# Patient Record
Sex: MALE | Race: ASIAN | NOT HISPANIC OR LATINO | Employment: UNEMPLOYED | URBAN - METROPOLITAN AREA
[De-identification: names, ages, dates, MRNs, and addresses within clinical notes are randomized per-mention and may not be internally consistent; named-entity substitution may affect disease eponyms.]

---

## 2019-03-06 ENCOUNTER — AMBULATORY - HEALTHEAST (OUTPATIENT)
Dept: CARDIAC REHAB | Facility: CLINIC | Age: 49
End: 2019-03-06

## 2019-03-06 DIAGNOSIS — Z95.1 S/P CABG X 4: ICD-10-CM

## 2019-03-27 ENCOUNTER — AMBULATORY - HEALTHEAST (OUTPATIENT)
Dept: CARDIAC REHAB | Facility: CLINIC | Age: 49
End: 2019-03-27

## 2019-03-27 DIAGNOSIS — Z95.1 S/P CABG X 4: ICD-10-CM

## 2019-03-27 RX ORDER — CLOPIDOGREL BISULFATE 75 MG/1
75 TABLET ORAL DAILY
Status: SHIPPED | COMMUNITY
Start: 2019-03-27

## 2019-03-27 RX ORDER — ASPIRIN 81 MG/1
81 TABLET, CHEWABLE ORAL DAILY
Status: SHIPPED | COMMUNITY
Start: 2019-03-27

## 2019-03-27 RX ORDER — ATORVASTATIN CALCIUM 80 MG/1
80 TABLET, FILM COATED ORAL AT BEDTIME
Status: SHIPPED | COMMUNITY
Start: 2019-03-27

## 2019-03-27 RX ORDER — NITROGLYCERIN 0.4 MG/1
0.4 TABLET SUBLINGUAL EVERY 5 MIN PRN
Status: SHIPPED | COMMUNITY
Start: 2019-03-27

## 2019-03-29 ENCOUNTER — AMBULATORY - HEALTHEAST (OUTPATIENT)
Dept: CARDIAC REHAB | Facility: CLINIC | Age: 49
End: 2019-03-29

## 2019-03-29 DIAGNOSIS — Z95.1 S/P CABG X 4: ICD-10-CM

## 2019-04-02 ENCOUNTER — AMBULATORY - HEALTHEAST (OUTPATIENT)
Dept: CARDIAC REHAB | Facility: CLINIC | Age: 49
End: 2019-04-02

## 2019-04-02 DIAGNOSIS — Z95.1 S/P CABG X 4: ICD-10-CM

## 2019-04-03 ENCOUNTER — AMBULATORY - HEALTHEAST (OUTPATIENT)
Dept: CARDIAC REHAB | Facility: CLINIC | Age: 49
End: 2019-04-03

## 2019-04-03 DIAGNOSIS — Z95.1 S/P CABG X 4: ICD-10-CM

## 2019-04-05 ENCOUNTER — AMBULATORY - HEALTHEAST (OUTPATIENT)
Dept: CARDIAC REHAB | Facility: CLINIC | Age: 49
End: 2019-04-05

## 2019-04-05 DIAGNOSIS — Z95.1 S/P CABG X 4: ICD-10-CM

## 2019-04-12 ENCOUNTER — AMBULATORY - HEALTHEAST (OUTPATIENT)
Dept: CARDIAC REHAB | Facility: CLINIC | Age: 49
End: 2019-04-12

## 2019-04-12 DIAGNOSIS — Z95.1 S/P CABG X 4: ICD-10-CM

## 2019-04-16 ENCOUNTER — AMBULATORY - HEALTHEAST (OUTPATIENT)
Dept: CARDIAC REHAB | Facility: CLINIC | Age: 49
End: 2019-04-16

## 2019-04-16 DIAGNOSIS — Z95.1 S/P CABG X 4: ICD-10-CM

## 2019-04-24 ENCOUNTER — AMBULATORY - HEALTHEAST (OUTPATIENT)
Dept: CARDIAC REHAB | Facility: CLINIC | Age: 49
End: 2019-04-24

## 2019-04-24 DIAGNOSIS — Z95.1 S/P CABG X 4: ICD-10-CM

## 2019-04-26 ENCOUNTER — AMBULATORY - HEALTHEAST (OUTPATIENT)
Dept: CARDIAC REHAB | Facility: CLINIC | Age: 49
End: 2019-04-26

## 2019-04-26 DIAGNOSIS — Z95.1 S/P CABG X 4: ICD-10-CM

## 2019-05-01 ENCOUNTER — AMBULATORY - HEALTHEAST (OUTPATIENT)
Dept: CARDIAC REHAB | Facility: CLINIC | Age: 49
End: 2019-05-01

## 2019-05-01 DIAGNOSIS — Z95.1 S/P CABG X 4: ICD-10-CM

## 2019-05-06 ENCOUNTER — AMBULATORY - HEALTHEAST (OUTPATIENT)
Dept: CARDIAC REHAB | Facility: CLINIC | Age: 49
End: 2019-05-06

## 2019-05-06 DIAGNOSIS — Z95.1 S/P CABG X 4: ICD-10-CM

## 2019-05-08 ENCOUNTER — AMBULATORY - HEALTHEAST (OUTPATIENT)
Dept: CARDIAC REHAB | Facility: CLINIC | Age: 49
End: 2019-05-08

## 2019-05-08 DIAGNOSIS — Z95.1 S/P CABG X 4: ICD-10-CM

## 2019-05-10 ENCOUNTER — AMBULATORY - HEALTHEAST (OUTPATIENT)
Dept: CARDIAC REHAB | Facility: CLINIC | Age: 49
End: 2019-05-10

## 2019-05-10 DIAGNOSIS — Z95.1 S/P CABG X 4: ICD-10-CM

## 2019-05-14 ENCOUNTER — AMBULATORY - HEALTHEAST (OUTPATIENT)
Dept: CARDIAC REHAB | Facility: CLINIC | Age: 49
End: 2019-05-14

## 2019-05-14 DIAGNOSIS — Z95.1 S/P CABG X 4: ICD-10-CM

## 2019-05-15 ENCOUNTER — AMBULATORY - HEALTHEAST (OUTPATIENT)
Dept: CARDIAC REHAB | Facility: CLINIC | Age: 49
End: 2019-05-15

## 2019-05-15 DIAGNOSIS — Z95.1 S/P CABG X 4: ICD-10-CM

## 2019-05-16 ENCOUNTER — AMBULATORY - HEALTHEAST (OUTPATIENT)
Dept: CARDIAC REHAB | Facility: CLINIC | Age: 49
End: 2019-05-16

## 2019-05-16 DIAGNOSIS — Z95.1 S/P CABG X 4: ICD-10-CM

## 2019-05-24 ENCOUNTER — AMBULATORY - HEALTHEAST (OUTPATIENT)
Dept: CARDIAC REHAB | Facility: CLINIC | Age: 49
End: 2019-05-24

## 2019-05-24 DIAGNOSIS — Z95.1 S/P CABG X 4: ICD-10-CM

## 2019-05-31 ENCOUNTER — AMBULATORY - HEALTHEAST (OUTPATIENT)
Dept: CARDIAC REHAB | Facility: CLINIC | Age: 49
End: 2019-05-31

## 2019-05-31 DIAGNOSIS — Z95.1 S/P CABG X 4: ICD-10-CM

## 2019-06-05 ENCOUNTER — AMBULATORY - HEALTHEAST (OUTPATIENT)
Dept: CARDIAC REHAB | Facility: CLINIC | Age: 49
End: 2019-06-05

## 2019-06-05 DIAGNOSIS — Z95.1 S/P CABG X 4: ICD-10-CM

## 2019-06-12 ENCOUNTER — AMBULATORY - HEALTHEAST (OUTPATIENT)
Dept: CARDIAC REHAB | Facility: CLINIC | Age: 49
End: 2019-06-12

## 2019-06-12 DIAGNOSIS — Z95.1 S/P CABG X 4: ICD-10-CM

## 2019-06-14 ENCOUNTER — AMBULATORY - HEALTHEAST (OUTPATIENT)
Dept: CARDIAC REHAB | Facility: CLINIC | Age: 49
End: 2019-06-14

## 2019-06-14 DIAGNOSIS — Z95.1 S/P CABG X 4: ICD-10-CM

## 2019-06-19 ENCOUNTER — AMBULATORY - HEALTHEAST (OUTPATIENT)
Dept: CARDIAC REHAB | Facility: CLINIC | Age: 49
End: 2019-06-19

## 2019-06-19 DIAGNOSIS — Z95.1 S/P CABG X 4: ICD-10-CM

## 2019-06-28 ENCOUNTER — AMBULATORY - HEALTHEAST (OUTPATIENT)
Dept: CARDIAC REHAB | Facility: CLINIC | Age: 49
End: 2019-06-28

## 2019-06-28 DIAGNOSIS — Z95.1 S/P CABG X 4: ICD-10-CM

## 2019-07-03 ENCOUNTER — AMBULATORY - HEALTHEAST (OUTPATIENT)
Dept: CARDIAC REHAB | Facility: CLINIC | Age: 49
End: 2019-07-03

## 2019-07-03 DIAGNOSIS — Z95.1 S/P CABG X 4: ICD-10-CM

## 2019-07-08 ENCOUNTER — AMBULATORY - HEALTHEAST (OUTPATIENT)
Dept: CARDIAC REHAB | Facility: CLINIC | Age: 49
End: 2019-07-08

## 2019-07-08 DIAGNOSIS — Z95.1 S/P CABG X 4: ICD-10-CM

## 2019-07-09 ENCOUNTER — AMBULATORY - HEALTHEAST (OUTPATIENT)
Dept: CARDIAC REHAB | Facility: CLINIC | Age: 49
End: 2019-07-09

## 2019-07-09 DIAGNOSIS — Z95.1 S/P CABG X 4: ICD-10-CM

## 2020-06-16 ENCOUNTER — VIRTUAL VISIT (OUTPATIENT)
Dept: FAMILY MEDICINE | Facility: OTHER | Age: 50
End: 2020-06-16

## 2020-06-16 ENCOUNTER — AMBULATORY - HEALTHEAST (OUTPATIENT)
Dept: FAMILY MEDICINE | Facility: CLINIC | Age: 50
End: 2020-06-16

## 2020-06-16 DIAGNOSIS — Z20.822 SUSPECTED COVID-19 VIRUS INFECTION: ICD-10-CM

## 2020-06-16 NOTE — PROGRESS NOTES
"Date: 2020 11:02:02  Clinician: Barney Saul  Clinician NPI: [id number]  Patient: Gayla Banda  Patient : 1970  Patient Address: [address] [place], [place] [id number]  Patient Phone: [phone]  Visit Protocol: URI  Patient Summary:  Gayla is a 50 year old ( : 1970 ) male who initiated a Visit for COVID-19 (Coronavirus) evaluation and screening. When asked the question \"Please sign me up to receive news, health information and promotions from [employer].\", Gayla responded \"No\".    Gayla states his symptoms started 1-2 days ago.   His symptoms consist of diarrhea and a cough.   Symptom details   Cough: Gayla coughs a few times an hour and his cough is not more bothersome at night. Phlegm comes into his throat when he coughs. He does not believe his cough is caused by post-nasal drip. The color of the phlegm is yellow.    Gayla denies having wheezing, nausea, teeth pain, ageusia, sore throat, enlarged lymph nodes, malaise, myalgias, anosmia, facial pain or pressure, fever, nasal congestion, vomiting, rhinitis, ear pain, headache, and chills. He also denies having recent facial or sinus surgery in the past 60 days and taking antibiotic medication for the symptoms. He is not experiencing dyspnea.   Precipitating events  He has not recently been exposed to someone with influenza. Gayla has been in close contact with the following high risk individuals: children under the age of 5.   Pertinent COVID-19 (Coronavirus) information  In the past 14 days, Gayla has not worked in a congregate living setting.   He does not work or volunteer as healthcare worker or a  and does not work or volunteer in a healthcare facility.   Gayla also has not lived in a congregate living setting in the past 14 days. He does not live with a healthcare worker.   Gayla has not had a close contact with a laboratory-confirmed COVID-19 patient within 14 days of symptom onset.   Pertinent medical history  Gayla does not need a return to " work/school note.   Weight: 190 lbs   Gayla does not smoke or use smokeless tobacco.   Weight: 190 lbs  Reason for repeat visit for the same protocol within 24 hours:  your site is screwy. I had to leave the page to look up my meds and it killed the page. This process is SOOOO difficult. As a college grad I'm having a hard time I can't imagine how immigrant and low English proficiency would have to deal with this CRAP!!!!!!!!!!!!  See the History of referred by protocol and completed visits section for additional details on the previous visit.    MEDICATIONS: metoprolol tartrate-hydrochlorothiazide oral, ALLERGIES: NKDA  Clinician Response:  Dear Gayla,   Your symptoms show that you may have coronavirus (COVID-19). This illness can cause fever, cough and trouble breathing. Many people get a mild case and get better on their own. Some people can get very sick.  What should I do?  We would like to test you for this virus.   1. Please call [phone] to schedule your visit. Explain that you were referred by [facility] to have a COVID-19 test. Be ready to share your [facility] visit ID number.  The following will serve as your written order for this COVID Test, ordered by me, for the indication of suspected COVID [Z20.828]: The test will be ordered in [facility], our electronic health record, after you are scheduled. It will show as ordered and authorized by Michael Oliveira MD.  Order: COVID-19 (Coronavirus) PCR for SYMPTOMATIC testing from [facility].      2. When it's time for your COVID test:  Stay at least 6 feet away from others. (If someone will drive you to your test, stay in the backseat, as far away from the  as you can.)   Cover your mouth and nose with a mask, tissue or washcloth.  Go straight to the testing site. Don't make any stops on the way there or back.      3.Starting now: Stay home and away from others (self-isolate) until:   You've had no fever---and no medicine that reduces fever---for 3 full days (72 hours). And...  "  Your other symptoms have gotten better. For example, your cough or breathing has improved. And...   At least 10 days have passed since your symptoms started.       During this time, don't leave the house except for testing or medical care.   Stay in your own room, even for meals. Use your own bathroom if you can.   Stay away from others in your home. No hugging, kissing or shaking hands. No visitors.  Don't go to work, school or anywhere else.    Clean \"high touch\" surfaces often (doorknobs, counters, handles, etc.). Use a household cleaning spray or wipes. You'll find a full list of  on the EPA website: www.epa.gov/pesticide-registration/list-n-disinfectants-use-against-sars-cov-2.   Cover your mouth and nose with a mask, tissue or washcloth to avoid spreading germs.  Wash your hands and face often. Use soap and water.  Caregivers in these groups are at risk for severe illness due to COVID-19:  o People 65 years and older  o People who live in a nursing home or long-term care facility  o People with chronic disease (lung, heart, cancer, diabetes, kidney, liver, immunologic)  o People who have a weakened immune system, including those who:   Are in cancer treatment  Take medicine that weakens the immune system, such as corticosteroids  Had a bone marrow or organ transplant  Have an immune deficiency  Have poorly controlled HIV or AIDS  Are obese (body mass index of 40 or higher)  Smoke regularly   o Caregivers should wear gloves while washing dishes, handling laundry and cleaning bedrooms and bathrooms.  o Use caution when washing and drying laundry: Don't shake dirty laundry, and use the warmest water setting that you can.  o For more tips, go to www.cdc.gov/coronavirus/2019-ncov/downloads/10Things.pdf.      How can I take care of myself?   Get lots of rest. Drink extra fluids (unless a doctor has told you not to).   Take Tylenol (acetaminophen) for fever or pain. If you have liver or kidney problems, ask " your family doctor if it's okay to take Tylenol.   Adults can take either:    650 mg (two 325 mg pills) every 4 to 6 hours, or...   1,000 mg (two 500 mg pills) every 8 hours as needed.    Note: Don't take more than 3,000 mg in one day. Acetaminophen is found in many medicines (both prescribed and over-the-counter medicines). Read all labels to be sure you don't take too much.   For children, check the Tylenol bottle for the right dose. The dose is based on the child's age or weight.    If you have other health problems (like cancer, heart failure, an organ transplant or severe kidney disease): Call your specialty clinic if you don't feel better in the next 2 days.       Know when to call 911. Emergency warning signs include:    Trouble breathing or shortness of breath Pain or pressure in the chest that doesn't go away Feeling confused like you haven't felt before, or not being able to wake up Bluish-colored lips or face.  Where can I get more information?   [facility] -- About COVID-19: [email or web address]   CDC -- What to Do If You're Sick: www.cdc.gov/coronavirus/2019-ncov/about/steps-when-sick.html   CDC -- Ending Home Isolation: www.cdc.gov/coronavirus/2019-ncov/hcp/disposition-in-home-patients.html   [facility] -- Caring for Someone: www.cdc.gov/coronavirus/2019-ncov/if-you-are-sick/care-for-someone.html   [facility] -- Interim Guidance for Hospital Discharge to Home: www.health.[facility].mn.us/diseases/coronavirus/hcp/hospdischarge.pdf   [facility] clinical trials (COVID-19 research studies): [email or web address]-clinical-trials    Below are the COVID-19 hotlines at the [place] Department of Health ([facility]). Interpreters are available.    For health questions: Call 450-797-9621 or 1-872.255.8704 (7 a.m. to 7 p.m.) For questions about schools and childcare: Call 850-969-1425 or 1-108.127.3775 (7 a.m. to 7 p.m.)    Diagnosis: Diarrhea, unspecified  Diagnosis ICD: R19.7

## 2020-06-18 ENCOUNTER — COMMUNICATION - HEALTHEAST (OUTPATIENT)
Dept: FAMILY MEDICINE | Facility: CLINIC | Age: 50
End: 2020-06-18

## 2020-09-04 ENCOUNTER — VIRTUAL VISIT (OUTPATIENT)
Dept: FAMILY MEDICINE | Facility: OTHER | Age: 50
End: 2020-09-04
Payer: COMMERCIAL

## 2020-09-04 PROCEDURE — 99421 OL DIG E/M SVC 5-10 MIN: CPT | Performed by: NURSE PRACTITIONER

## 2020-09-04 NOTE — PROGRESS NOTES
"Date: 2020 01:50:52  Clinician: Noris Lopez  Clinician NPI: [id number]  Patient: Gayla Banda  Patient : 1970  Patient Address: [address] [place], [place] [address]  Patient Phone: [phone]  Visit Protocol: URI  Patient Summary:  Gayla is a 50 year old ( : 1970 ) male who initiated a Visit for COVID-19 (Coronavirus) evaluation and screening. When asked the question \"Please sign me up to receive news, health information and promotions from [employer].\", Gayla responded \"No\".    Gayla states his symptoms started suddenly 5-6 days ago. After his symptoms started, they improved and then got worse again.   His symptoms consist of diarrhea and malaise.   Gayla denies having ear pain, anosmia, facial pain or pressure, fever, vomiting, rhinitis, nausea, wheezing, sore throat, teeth pain, ageusia, cough, nasal congestion, headache, chills, enlarged lymph nodes, and myalgias. He also denies taking antibiotic medication in the past month and having recent facial or sinus surgery in the past 60 days. He is not experiencing dyspnea.    Pertinent COVID-19 (Coronavirus) information  In the past 14 days, Gayla has not worked in a congregate living setting.   He does not work or volunteer as healthcare worker or a  and does not work or volunteer in a healthcare facility.   Gayla also has not lived in a congregate living setting in the past 14 days. He does not live with a healthcare worker.   Gayla has had a close contact with a laboratory-confirmed COVID-19 patient within 14 days of symptom onset.   Since 2019, Gayla and has not had upper respiratory infection or influenza-like illness. has been diagnosed with lab-confirmed COVID-19 test    Date of his positive COVID-19 test: 2020    Pertinent medical history  Gayla does not need a return to work/school note.   Weight: 190 lbs   Gayla does not smoke or use smokeless tobacco.   Weight: 190 lbs    MEDICATIONS: No current medications, ALLERGIES: " NKDA  Clinician Response:  Dear Shukla,  Your health is our priority. Based on the information you have provided, it is possible that you may have some type of viral infection.  Please read the full treatment plan and see my recommendations below.  Medication information  Because you have a viral infection, antibiotics will not help you get better. Treating a viral infection with antibiotics could actually make you feel worse.  Self care  Steps you can take to be as comfortable as possible:     Rest.    Drink plenty of fluids.     COVID-19 (Coronavirus) General Information  Because there is currently no vaccine to prevent infection, the best way to protect yourself is to avoid being exposed to this virus. Common symptoms of COVID-19 include but are not limited to fever, cough, and shortness of breath. These symptoms appear 2-14 days after you are exposed to the virus that causes COVID-19. Click here for more information from the CDC on how to protect yourself.  If you are sick with COVID-19 or suspect you are infected with the virus that causes COVID-19, follow the steps here from the CDC to help prevent the disease from spreading to people in your home and community.  Click here for general information from the CDC on testing.  If you develop any of these emergency warning signs for COVID-19, get medical attention immediately:     Trouble breathing    Persistent pain or pressure in the chest    New confusion or inability to arouse    Bluish lips or face      Call your doctor or clinic before going in. Call 911 if you have a medical emergency and notify the  you have or think you may have COVID-19.  For more detailed and up to date information on COVID-19 (Coronavirus), please visit the CDC website.   Diagnosis: COVID-19 (Coronavirus) concern  Diagnosis ICD: Z20.828  Additional Clinician Notes:  You can go to the CDC website for more information.&nbsp;   Drink plenty of fluids to stay hydrated. You can try over  the counter medications for your symptoms such as pepto bismuth or Imodium. Follow up with your primary provider within a week.&nbsp;

## 2020-09-08 ENCOUNTER — VIRTUAL VISIT (OUTPATIENT)
Dept: FAMILY MEDICINE | Facility: OTHER | Age: 50
End: 2020-09-08
Payer: COMMERCIAL

## 2020-09-08 PROCEDURE — 99421 OL DIG E/M SVC 5-10 MIN: CPT | Performed by: FAMILY MEDICINE

## 2020-09-08 NOTE — PROGRESS NOTES
"Date: 2020 12:46:36  Clinician: Jose Ramon Rick  Clinician NPI: [id number]  Patient: Gayla Banda  Patient : 1970  Patient Address: [address] [place], [place] [id number]  Patient Phone: [phone]  Visit Protocol: URI  Patient Summary:  Gayla is a 50 year old ( : 1970 ) male who initiated a Visit for COVID-19 (Coronavirus) evaluation and screening. When asked the question \"Please sign me up to receive news, health information and promotions from [employer].\", Gayla responded \"No\".    Gayla states his symptoms started today.   His symptoms consist of facial pain or pressure and myalgia.   Symptom details   Facial pain or pressure: The facial pain or pressure feels worse when bending over or leaning forward.    Gayla denies having ear pain, anosmia, fever, vomiting, rhinitis, nausea, wheezing, sore throat, teeth pain, ageusia, diarrhea, cough, nasal congestion, headache, chills, and malaise. He also denies taking antibiotic medication in the past month and having recent facial or sinus surgery in the past 60 days. He is not experiencing dyspnea.   Precipitating events  He has recently been exposed to someone with influenza. Gayla has not been in close contact with any high risk individuals.   Pertinent COVID-19 (Coronavirus) information  In the past 14 days, Gayla has not worked in a congregate living setting.   He does not work or volunteer as healthcare worker or a  and does not work or volunteer in a healthcare facility.   Gayla also has not lived in a congregate living setting in the past 14 days. He does not live with a healthcare worker.   Gayla has had a close contact with a laboratory-confirmed COVID-19 patient within 14 days of symptom onset.   Since 2019, Gayla and has not had upper respiratory infection or influenza-like illness. Has not been diagnosed with lab-confirmed COVID-19 test   Pertinent medical history  Gayla does not need a return to work/school note.   Weight: 210 lbs   Shukla " "does not smoke or use smokeless tobacco.   Weight: 210 lbs    MEDICATIONS: No current medications, ALLERGIES: NKDA  Clinician Response:  Dear Shukla,   Your symptoms show that you may have coronavirus (COVID-19). This illness can cause fever, cough and trouble breathing. Many people get a mild case and get better on their own. Some people can get very sick.  What should I do?  We would like to test you for this virus.   1. Please call [phone] to schedule your visit. Explain that you were referred by [facility] to have a COVID-19 test. Be ready to share [facility].  The following will serve as your written order for this COVID Test, ordered by me, for the indication of suspected COVID [Z20.828]: The test will be ordered in [facility], our electronic health record, after you are scheduled. It will show as ordered and authorized by Michael Oliveira MD.  Order: COVID-19 (Coronavirus) PCR for SYMPTOMATIC testing from [facility].      2. When it's time for your COVID test:  Stay at least 6 feet away from others. (If someone will drive you to your test, stay in the backseat, as far away from the  as you can.)   Cover your mouth and nose with a mask, tissue or washcloth.  Go straight to the testing site. Don't make any stops on the way there or back.      3.Starting now: Stay home and away from others (self-isolate) until:   You've had no fever---and no medicine that reduces fever---for one full day (24 hours). And...   Your other symptoms have gotten better. For example, your cough or breathing has improved. And...   At least 10 days have passed since your symptoms started.       During this time, don't leave the house except for testing or medical care.   Stay in your own room, even for meals. Use your own bathroom if you can.   Stay away from others in your home. No hugging, kissing or shaking hands. No visitors.  Don't go to work, school or anywhere else.    Clean \"high touch\" surfaces often (doorknobs, counters, " handles, etc.). Use a household cleaning spray or wipes. You'll find a full list of  on the EPA website: www.epa.gov/pesticide-registration/list-n-disinfectants-use-against-sars-cov-2.   Cover your mouth and nose with a mask, tissue or washcloth to avoid spreading germs.  Wash your hands and face often. Use soap and water.  Caregivers in these groups are at risk for severe illness due to COVID-19:  o People 65 years and older  o People who live in a nursing home or long-term care facility  o People with chronic disease (lung, heart, cancer, diabetes, kidney, liver, immunologic)  o People who have a weakened immune system, including those who:   Are in cancer treatment  Take medicine that weakens the immune system, such as corticosteroids  Had a bone marrow or organ transplant  Have an immune deficiency  Have poorly controlled HIV or AIDS  Are obese (body mass index of 40 or higher)  Smoke regularly   o Caregivers should wear gloves while washing dishes, handling laundry and cleaning bedrooms and bathrooms.  o Use caution when washing and drying laundry: Don't shake dirty laundry, and use the warmest water setting that you can.  o For more tips, go to www.cdc.gov/coronavirus/2019-ncov/downloads/10Things.pdf.    4.Sign up for [employer] [facility]. We know it's scary to hear that you might have COVID-19. We want to track your symptoms to make sure you're okay over the next 2 weeks. Please look for an email from [employer]---this is a free, online program that we'll use to keep in touch. To sign up, follow the link in the email. Learn more at [email or web address]  How can I take care of myself?   Get lots of rest. Drink extra fluids (unless a doctor has told you not to).   Take Tylenol (acetaminophen) for fever or pain. If you have liver or kidney problems, ask your family doctor if it's okay to take Tylenol.   Adults can take either:    650 mg (two 325 mg pills) every 4 to 6 hours, or...   1,000 mg (two  500 mg pills) every 8 hours as needed.    Note: Don't take more than 3,000 mg in one day. Acetaminophen is found in many medicines (both prescribed and over-the-counter medicines). Read all labels to be sure you don't take too much.   For children, check the Tylenol bottle for the right dose. The dose is based on the child's age or weight.    If you have other health problems (like cancer, heart failure, an organ transplant or severe kidney disease): Call your specialty clinic if you don't feel better in the next 2 days.       Know when to call 911. Emergency warning signs include:    Trouble breathing or shortness of breath Pain or pressure in the chest that doesn't go away Feeling confused like you haven't felt before, or not being able to wake up Bluish-colored lips or face.  Where can I get more information?   [facility] -- About COVID-19: [email or web address]   CDC -- What to Do If You're Sick: www.cdc.gov/coronavirus/2019-ncov/about/steps-when-sick.html   CDC -- Ending Home Isolation: www.cdc.gov/coronavirus/2019-ncov/hcp/disposition-in-home-patients.html   CDC -- Caring for Someone: www.cdc.gov/coronavirus/2019-ncov/if-you-are-sick/care-for-someone.html   [facility] -- Interim Guidance for Hospital Discharge to Home: www.health.[facility].mn.us/diseases/coronavirus/hcp/hospdischarge.pdf   [facility] clinical trials (COVID-19 research studies): clinicalaffairs.[facility]-clinical-trials    Below are the COVID-19 hotlines at the [place] Department of Health ([facility]). Interpreters are available.    For health questions: Call 098-649-7884 or 1-143.154.5677 (7 a.m. to 7 p.m.) For questions about schools and childcare: Call 822-665-7710 or 1-888.119.9176 (7 a.m. to 7 p.m.)    Diagnosis: Myalgia, unspecified site  Diagnosis ICD: M79.10

## 2020-09-09 ENCOUNTER — AMBULATORY - HEALTHEAST (OUTPATIENT)
Dept: FAMILY MEDICINE | Facility: CLINIC | Age: 50
End: 2020-09-09

## 2020-09-09 ENCOUNTER — AMBULATORY - HEALTHEAST (OUTPATIENT)
Dept: INTERNAL MEDICINE | Facility: CLINIC | Age: 50
End: 2020-09-09

## 2020-09-09 DIAGNOSIS — Z20.822 SUSPECTED 2019 NOVEL CORONAVIRUS INFECTION: ICD-10-CM

## 2020-09-11 ENCOUNTER — COMMUNICATION - HEALTHEAST (OUTPATIENT)
Dept: SCHEDULING | Facility: CLINIC | Age: 50
End: 2020-09-11

## 2020-11-16 ENCOUNTER — AMBULATORY - HEALTHEAST (OUTPATIENT)
Dept: FAMILY MEDICINE | Facility: CLINIC | Age: 50
End: 2020-11-16

## 2020-11-16 ENCOUNTER — VIRTUAL VISIT (OUTPATIENT)
Dept: FAMILY MEDICINE | Facility: OTHER | Age: 50
End: 2020-11-16

## 2020-11-16 DIAGNOSIS — Z20.822 SUSPECTED COVID-19 VIRUS INFECTION: ICD-10-CM

## 2020-11-16 NOTE — PROGRESS NOTES
"Date: 2020 10:06:48  Clinician: Barney Saul  Clinician NPI: [id number]  Patient: Gayla Banda  Patient : 1970  Patient Address: [address] [place], [place] [id number]  Patient Phone: [phone]  Visit Protocol: URI  Patient Summary:  Gayla is a 50 year old ( : 1970 ) male who initiated a [facility] Visit for COVID-19 (Coronavirus) evaluation and screening. When asked the question \"Please sign me up to receive news, health information and promotions from [facility].\", Gayla responded \"No\".    Gayla states his symptoms started 1-2 days ago.   His symptoms consist of rhinitis, facial pain or pressure, myalgia, chills, a sore throat, diarrhea, a headache, a cough, and nasal congestion. Gayla also feels feverish but was unable to measure his temperature.   Symptom details     Nasal secretions: The color of his mucus is white.    Cough: Gayla coughs a few times an hour and his cough is not more bothersome at night. Phlegm does not come into his throat when he coughs. He does not believe his cough is caused by post-nasal drip.     Sore throat: Gayla reports having mild throat pain (1-3 on a 10 point pain scale), does not have exudate on his tonsils, and can swallow liquids. He is not sure if the lymph nodes in his neck are enlarged. A rash has not appeared on the skin since the sore throat started.     Facial pain or pressure: The facial pain or pressure does not feel worse when bending or leaning forward.     Headache: He states the headache is mild (1-3 on a 10 point pain scale).      Gayla denies having vomiting, malaise, teeth pain, ageusia, ear pain, wheezing, nausea, and anosmia. He also denies taking antibiotic medication in the past month, having recent facial or sinus surgery in the past 60 days, and having a sinus infection within the past year. He is not experiencing dyspnea.   Precipitating events  Within the past week, Gayla has not been exposed to someone with strep throat. He has not recently been exposed " to someone with influenza. Gayla has been in close contact with the following high risk individuals: immunocompromised people and people with asthma, heart disease or diabetes.   Pertinent COVID-19 (Coronavirus) information  Gayla does not work or volunteer as healthcare worker or a . In the past 14 days, aGyla has not worked or volunteered at a healthcare facility or group living setting.   In the past 14 days, he also has not lived in a congregate living setting.   Gayla has had a close contact with a laboratory-confirmed COVID-19 patient within 14 days of symptom onset. He was not exposed at his work. He does not know when he was exposed to the laboratory-confirmed COVID-19 patient.   Additional information about contact with COVID-19 (Coronavirus) patient as reported by the patient (free text): a relative    Since December 2019, Gayla has been tested for COVID-19 and has not had upper respiratory infection or influenza-like illness.      Result of COVID-19 test: Negative     Date of his COVID-19 test: 09/20/2020      Pertinent medical history  Gayla does not need a return to work/school note.   Weight: 180 lbs   Gayla does not smoke or use smokeless tobacco.   Weight: 180 lbs    MEDICATIONS: No current medications, ALLERGIES: NKDA  Clinician Response:  Dear Gayla,   Your symptoms show that you may have coronavirus (COVID-19). This illness can cause fever, cough and trouble breathing. Many people get a mild case and get better on their own. Some people can get very sick.  What should I do?  We would like to test you for this virus.   1. Please call [phone] to schedule your visit. Explain that you were referred by [facility] to have a COVID-19 test. Be ready to share your [facility] visit ID number.  * If you need to schedule in [facility] please call [phone] or for [employer] employees please call [phone].  * If you need to schedule in the [place] area please call [phone]. [place] employees call  "[phone].  The following will serve as your written order for this COVID Test, ordered by me, for the indication of suspected COVID [Z20.828]: The test will be ordered in [facility], our electronic health record, after you are scheduled. It will show as ordered and authorized by Michael Oliveira MD.  Order: COVID-19 (Coronavirus) PCR for SYMPTOMATIC testing from [facility].   2. When it's time for your COVID test:  Stay at least 6 feet away from others. (If someone will drive you to your test, stay in the backseat, as far away from the  as you can.)   Cover your mouth and nose with a mask, tissue or washcloth.  Go straight to the testing site. Don't make any stops on the way there or back.      3.Starting now: Stay home and away from others (self-isolate) until:   You've had no fever---and no medicine that reduces fever---for one full day (24 hours). And...   Your other symptoms have gotten better. For example, your cough or breathing has improved. And...   At least 10 days have passed since your symptoms started.       During this time, don't leave the house except for testing or medical care.   Stay in your own room, even for meals. Use your own bathroom if you can.   Stay away from others in your home. No hugging, kissing or shaking hands. No visitors.  Don't go to work, school or anywhere else.    Clean \"high touch\" surfaces often (doorknobs, counters, handles, etc.). Use a household cleaning spray or wipes. You'll find a full list of  on the EPA website: www.epa.gov/pesticide-registration/list-n-disinfectants-use-against-sars-cov-2.   Cover your mouth and nose with a mask, tissue or washcloth to avoid spreading germs.  Wash your hands and face often. Use soap and water.  Caregivers in these groups are at risk for severe illness due to COVID-19:  o People 65 years and older  o People who live in a nursing home or long-term care facility  o People with chronic disease (lung, heart, cancer, diabetes, kidney, " liver, immunologic)  o People who have a weakened immune system, including those who:   Are in cancer treatment  Take medicine that weakens the immune system, such as corticosteroids  Had a bone marrow or organ transplant  Have an immune deficiency  Have poorly controlled HIV or AIDS  Are obese (body mass index of 40 or higher)  Smoke regularly   o Caregivers should wear gloves while washing dishes, handling laundry and cleaning bedrooms and bathrooms.  o Use caution when washing and drying laundry: Don't shake dirty laundry, and use the warmest water setting that you can.  o For more tips, go to www.cdc.gov/coronavirus/2019-ncov/downloads/10Things.pdf.       How can I take care of myself?   Get lots of rest. Drink extra fluids (unless a doctor has told you not to).   Take Tylenol (acetaminophen) for fever or pain. If you have liver or kidney problems, ask your family doctor if it's okay to take Tylenol.   Adults can take either:    650 mg (two 325 mg pills) every 4 to 6 hours, or...   1,000 mg (two 500 mg pills) every 8 hours as needed.    Note: Don't take more than 3,000 mg in one day. Acetaminophen is found in many medicines (both prescribed and over-the-counter medicines). Read all labels to be sure you don't take too much.   For children, check the Tylenol bottle for the right dose. The dose is based on the child's age or weight.    If you have other health problems (like cancer, heart failure, an organ transplant or severe kidney disease): Call your specialty clinic if you don't feel better in the next 2 days.       Know when to call 911. Emergency warning signs include:    Trouble breathing or shortness of breath Pain or pressure in the chest that doesn't go away Feeling confused like you haven't felt before, or not being able to wake up Bluish-colored lips or face.  Where can I get more information?    [employer] -- About COVID-19: [email or web address]   CDC -- What to Do If You're Sick:  www.cdc.gov/coronavirus/2019-ncov/about/steps-when-sick.html   CDC -- Ending Home Isolation: www.cdc.gov/coronavirus/2019-ncov/hcp/disposition-in-home-patients.html   [facility] -- Caring for Someone: www.cdc.gov/coronavirus/2019-ncov/if-you-are-sick/care-for-someone.html   [facility] -- Interim Guidance for Hospital Discharge to Home: www.[facility].mn./diseases/coronavirus/hcp/hospdischarge.pdf   [facility] clinical trials (COVID-19 research studies): clinicalaffairs.[facility]-clinical-trials    Below are the COVID-19 hotlines at the [place] [facility] ([facility]). Interpreters are available.    For health questions: Call 796-892-3796 or 1-928.443.2434 (7 a.m. to 7 p.m.) For questions about schools and childcare: Call 735-899-9190 or 1-568.921.6205 (7 a.m. to 7 p.m.)    Diagnosis: Contact with and (suspected) exposure to other viral communicable diseases  Diagnosis ICD: Z20.828

## 2020-11-18 ENCOUNTER — COMMUNICATION - HEALTHEAST (OUTPATIENT)
Dept: SCHEDULING | Facility: CLINIC | Age: 50
End: 2020-11-18

## 2021-05-27 NOTE — PROGRESS NOTES
Cardiac Rehab  Phase II Assessment    Assessment Date: 3/27/19    Diagnosis: CAD  Date of Onset: 12/2018  Procedure: CABG x 4  Date of Onset: 1/4/19  ICD/Pacemaker: No Parameters: none  Post-op Complications: none  ECG History: SR EF%:60, per stress test 12/20/18  Past Medical History: presented with CP on exertion; type 2 DM; denies lung disease; s/p L knee orthroscopy 15 yrs ago; cholesterol; HTN     Physical Assessment  Precautions/ Physical Limitations: Surgical  Oxygen: No  O2 Sats: 97 Lung Sounds: clear Edema: none  Incisions: clean/dry/intact  Sleeping Pattern: good   Appetite: good   Nutrition Risk Screen: Completed.    Pain  Location: L leg, R thigh, incisional   Characteristics:ache, soreness  Intensity: (0-10 scale) 2  Current Pain Management: rest  Intervention: rest  Response: no change    Psychosocial/ Emotional Health  1. In the past 12 months, have you been in a relationship where you have been abused physically, emotionally, sexually or financially? No  notified: NA  2. Who do you turn to for emotional support?: spouse  3. Do you have cultural or spiritual needs? No  4. Have there been any major life changes in the past 12 months? Yes- CABG    Referral Information  Primary Physician: Dr. Dela Cruz ([facility])  Cardiologist: Khalida Ellis NP at [facility]  Surgeon: Dr. Geovanny Alva    Home exercise/Equipment: none    Patient's long-term goal(s): Resume normal working hours/tasks, fishing, housework    1. Living Accommodations: Home Steps: Yes      Support people at home: wife   2. Marital Status:   3. Family is able to assist with cares      Synagogue/Community involvement: none  4. Recreation/Hobbies: fishing

## 2021-05-27 NOTE — PROGRESS NOTES
ITP ASSESSMENT   Assessment Day: Initial    Session Number: 1/2  Precautions: Surgical    Diagnosis: CAB    Risk Stratification: High    Referring Provider: Flaco Alva MD  EXERCISE  Exercise Assessment: Initial       6 Minute Walk Test   Pre   Pre Exercise HR: 78                    Pre Exercise BP: 118/68      Peak  Peak HR: 95                   Peak BP: 146/68    Peak feet: 1050    Peak O2 SAT: 97    Peak RPE: 13    Peak MPH: 1.99      Symptoms:  Peak Symptoms: 6/10 incisional pain (started from a 2/10 at rest)      5 mins. Post  5 Min Post HR: 78    5 Min Post BP: 106/66                           Exercise Plan  Goals Next 30 days  ADL'S: Resume occ vacuuming.    Leisure: Walking 2-3x/week for 10-15 mins.    Work: Resume regular working hours with light duty (carry up to 25 lbs).      Education Goals: Patient can state cardiac s/s and appropriate emergency response.    Education Goals Met: Has system for taking medication.;Medication review.      Exercise Prescription  Exercise Mode: Treadmill;Bike;Nustep;Arm Erg.;Stairs    Frequency: 2x/week    Duration: 30-45 mins    Intensity / THR: 20-30 beats above resting heart rate    RPE 11-14  Progression / Met level: 2.6-3    Resistive Training?: Yes      Current Exercise (mins/week): 5      Interventions  Home Exercise:  Mode: Walking    Frequency: 2-3x/week    Duration: 10-15 mins      Education Material : Educational videos;Provide written material;Individual education and counseling;Offer educational classes      Education Completed  Exercise Education Completed: Cardiac Anatomy;Signs and Symptoms;Medication review;RPE;Emergency Plan;Home Exercise;Warm up/cool down;FITT Principles;BP/HR Reponse to exercise;Benefits of Exercise;End point of exercise              Exercise Follow-up/Discharge  Follow up/Discharge: Pt encouraged to walk 2-3x/week.   NUTRITION  Nutrition Assessment: Initial      Nutrition Risk Factors:  Nutrition Risk Factors:  "Diabetes;Dyslipidemia;Overweight  HbA1c: 8.8  Monitors blood sugar at home: Yes  Frequency: 2-4x/week  Cholesterol: 129  LDL: 72  HDL: 35  Triglycerides: 109      Nutrition Plan  Interventions  Other Nutrition Intervention: Diet Class;Therapist/Pt Discussion;Educational Videos;Provide with Written Material      Education Completed  Nutrition Education Completed: Risk factor overview      Goals  Nutrition Goals (Next 30 days): Patient can identify their risk factors for CAD;Patient will follow a low sodium diet;Patient able to demonstrate carbohydrate counting;Patient will follow a low saturated fat diet;Patient knows appropriate portion size;Patient will lose weight      Goals Met  Nutrition Goals Met: Completed Nutritional Risk Screen;Provided Rate your Plate Survey;Reviewed Dietitian schedule      Height, Weight, and  BMI  Weight: 181 lb 3.2 oz (82.2 kg)  Height: 5' 3\" (1.6 m)  BMI: 32.11      Nutrition Follow-up  Follow-up/Discharge: Pt given diet survey.         Other Risk Factors  Other Risk Factor Assessment: Initial      HTN Risk Factor: Hypertension      Pre Exercise BP: 118/68  Post Exercise BP: 106/66      Hypertension Plan  Goals  HTN Goals: Follow low sodium diet;Exercises regularly      Goals Met  HTN Goals Met: Take medication as prescribed      HTN Interventions  HTN Interventions: Diet consult;Therapist/patient discussion;Provide written material;Offer educational videos;Offer educational classes      HTN Education Completed  HTN Education Completed: Medication review;Risk factor overview      Tobacco Risk Factor: NA        Risk Factor Follow-up   Follow-up/Discharge: Pt taking BP meds.     PSYCHOSOCIAL  Psychosocial Assessment: Initial       [facility] Q of L Summary Score: 26      DESEAN-D Score: 13      Psychosocial Risk Factor: NA      Psychosocial Plan  Interventions  Interventions: Offer educational videos and classes;Provide written material;Individual education and counseling      Education " Completed  Education Completed: Relaxation/Coping Techniques      Goals  Goals (Next 30 days): Improvement in [employer] COOP score;Practicing stress management skills      Goals Met  Goals Met: Identified Support system;Oriented to stress management classes;Identify stressors      Psychosocial Follow-up  Follow-up/Discharge: Pt denies regular stress.  He identifies his wife as part of his stress support system.             Patient involved in Goal setting?: Yes      Signature: _____________________________________________________________    Date: __________________    Time: __________________

## 2021-05-28 NOTE — PROGRESS NOTES
ITP ASSESSMENT   Assessment Day: 30 Day    Session Number: 10  Precautions: Surgical    Diagnosis: CAB    Risk Stratification: High    Referring Provider: Flaco Alva MD   ITP: Dr. Orozco  EXERCISE  Exercise Assessment: Reassessment                       Exercise Plan  Goals Next 30 days  ADL'S: Resume household duties: sweep, mop and vacuuming.     Leisure: Exercise on non rehab days.     Work: Resume catering duties minimal 20lbs+, work on upper body stamina with arm use and strength training in CR      Education Goals: Patient can state cardiac s/s and appropriate emergency response.    Education Goals Met: Has system for taking medication.;Medication review.                          Goals Met  Initial ADL's goals met: Goal not met: pt has not vacuumed yet    Initial Leisure goals met: Rarely pt walks on non rehab days 5 min 2 times since SOC    Intial Work goals met: Pt has returned to work but not doing full duties-he needs to lift heavier items 20+lbs He is on light duty.    Initial Progression: Pt has increased to a 3.2MET level for 30-40minutes with occasional c/o chest/incisional chest pressure-likely s/p CAB precations. Continue to progress pt to LTG of 5-6METs      Exercise Prescription  Exercise Mode: Treadmill;Bike;Nustep;Arm Erg.;Stairs    Frequency: 2x/week    Duration: 30-45minutes    Intensity / THR: 20-30 beats above resting heart rate    RPE 11-14  Progression / Met level: 3.2-4.5+    Resistive Training?: Yes      Current Exercise (mins/week): 68      Interventions  Home Exercise:  Mode: Walking    Frequency: 2-3x/week    Duration: 15minutes x2/day on non rehab days      Education Material : Educational videos;Provide written material;Individual education and counseling;Offer educational classes      Education Completed  Exercise Education Completed: Cardiac Anatomy;Signs and Symptoms;Medication review;RPE;Emergency Plan;Home Exercise;Warm up/cool down;FITT Principles;BP/HR Reponse to  "exercise;Benefits of Exercise;End point of exercise;Stretching;Strength training              Exercise Follow-up/Discharge  Follow up/Discharge: Pt is encouraged to arrive to CR on time to utilize full hour for CR and progression to goals           NUTRITION  Nutrition Assessment: Reassessment    Nutrition Risk Factors:  Nutrition Risk Factors: Diabetes;Dyslipidemia;Overweight  HbA1c: 8.8  Monitors blood sugar at home: Yes  Frequency: 2-4x/week  Cholesterol: 129  LDL: 72  HDL: 35  Triglycerides: 1009      Nutrition Plan  Interventions  Diet Consult: Completed (diet consult 4/2/19)    Other Nutrition Intervention: Diet Class;Therapist/Pt Discussion;Provide with Written Material    Initial Rate Your Plate Score: 0 (Pt did not return/declined)    Follow-Up Rate Your Plate Score: 0 (declined)      Education Completed  Nutrition Education Completed: Low Saturated fat diet;Low sodium diet;Risk factor overview      Goals  Nutrition Goals (Next 30 days): Patient can identify their risk factors for CAD      Goals Met  Nutrition Goals Met: Patient follows a low sodium diet;Patient states following a low saturated fat diet;Patient knows appropriate portion size;Reviewed Dietitian schedule;Provided Rate your Plate Survey;Completed Nutritional Risk Screen;Patient has lost weight      Height, Weight, and  BMI  Weight: 178 lb (80.7 kg)  Height: 5' 3\" (1.6 m)  BMI: 31.54      Nutrition Follow-up  Follow-up/Discharge: RD diet education completed 4/2/2019       Other Risk Factors  Other Risk Factor Assessment: Reassessment      HTN Risk Factor: Hypertension      Pre Exercise BP: 112/70  Post Exercise BP: 102/66      Hypertension Plan  Goals  HTN Goals: Exercises regularly      Goals Met  HTN Goals Met: Take medication as prescribed;Follow low sodium diet      HTN Interventions  HTN Interventions: Diet consult;Therapist/patient discussion;Provide written material;Offer educational classes      HTN Education Completed  HTN Education " Completed: Medication review;Risk factor overview;Low sodium diet      Tobacco Risk Factor: NA    Risk Factor Follow-up   Follow-up/Discharge: Pt encouraged to ex more regularly. He is taking medications for CHOL and DM but not BP. Pt's BP's have been WNL. He is following low fat, low salt and DM diet at least 70% of the time to help modify CRF.          PSYCHOSOCIAL  Psychosocial Assessment: Reassessment       [facility] Q of L Summary Score: 26 (pre score)      DESEAN-D Score: 13 (pre score)      Psychosocial Risk Factor: NA      Psychosocial Plan  Interventions  Interventions: Offer educational videos and classes;Provide written material;Individual education and counseling      Education Completed  Education Completed: Effects of stress on body;S/S of depression;Relaxation/Coping Techniques      Goals  Goals (Next 30 days): Improvement in [employer] COOP score;Practicing stress management skills      Goals Met  Goals Met: Identified Support system;Oriented to stress management classes;Identify stressors      Psychosocial Follow-up  Follow-up/Discharge: Pt denies regular stress and that it is not a concern for him. He has a great support system and enjoys spending time with family and watch movies.            Patient involved in Goal setting?: Yes

## 2021-05-29 NOTE — PROGRESS NOTES
ITP ASSESSMENT   Assessment Day: 90 Day    Session Number: 24  Precautions: Surgical    Diagnosis: CAB    Risk Stratification: High    Referring Provider: Flaco Alva MD  EXERCISE  Exercise Assessment: Reassessment                            Exercise Plan  Goals Next 30 days  ADL'S: Independent    Leisure: Resume light swimiming at the [facility] for 10-20 mins.    Work: Increase U/E strength by increasing to 5 lbs when doing wt exercises.      Education Goals: Patient can state cardiac s/s and appropriate emergency response.    Education Goals Met: Has system for taking medication.;Medication review.                          Goals Met  60 day ADL'S goals met: Goal met- pt has resumed mopping and vacuuming at home.    60 day Leisure goals met: Goal met- pt has been walking 2x/week for 20 mins.    60 day Work goals met: Goal not reached- pt has not been doing wts 2x/week.    30 day ADL'S goals met: Goal partially met: Pt has resumed household duties. Pt feels SOB when doing chores    30 day Leisure goals met: Goal Met- Pt has been exercising 10 minutes everyday on non rehab days     30 day Work goals met: Goal partially met- Pt has been able to lift up 20 lb, continue to increase upperbody strength     30 Day Progression: Pt has reached a 3-4.4METs. Continue to progress pt as tolerated to LTG of 5-6METs. Pt has been limited with progression due to incisional discomfort and regular home ex program and regular CR attendence. Try other modalities to increase METs to LTG. Will address Activity goals/goals met/intervention when pt present.      Initial ADL's goals met: Goal not met: pt has not vacuumed yet    Initial Leisure goals met: Rarely pt walks on non rehab days 5 min 2 times since SOC    Intial Work goals met: Pt has returned to work but not doing full duties-he needs to lift heavier items 20+lbs He is on light duty.    Initial Progression: Pt has increased to a 3.2MET level for 30-40minutes with occasional  c/o chest/incisional chest pressure-likely s/p CAB precations. Continue to progress pt to LTG of 5-6METs      Exercise Prescription  Exercise Mode: Treadmill;Bike;Nustep;Arm Erg.;Stairs    Frequency: 2x/week    Duration: 30-45 mins    Intensity / THR: 20-30 beats above resting heart rate    RPE 11-14  Progression / Met level: 5.2-6    Resistive Training?: Yes      Current Exercise (mins/week): 110      Interventions  Home Exercise:  Mode: Walking    Frequency: 2-3x/week    Duration: 15-30 mins      Education Material : Educational videos;Provide written material;Individual education and counseling;Offer educational classes      Education Completed  Exercise Education Completed: Cardiac Anatomy;Signs and Symptoms;Medication review;RPE;Emergency Plan;Home Exercise;Warm up/cool down;FITT Principles;BP/HR Reponse to exercise;Benefits of Exercise;End point of exercise;Stretching;Strength training              Exercise Follow-up/Discharge  Follow up/Discharge: Pt has reached peak of 5.1 on TM for 30-35 mins on TM.  He is encouraged to continue walking 2x/week for 15-30 mins.   NUTRITION  Nutrition Assessment: Reassessment      Nutrition Risk Factors:  Nutrition Risk Factors: Diabetes;Dyslipidemia;Overweight  HbA1c: 8.8  Monitors blood sugar at home: Yes  Frequency: 2-4x/week  Cholesterol: 129  LDL: 72  HDL: 35  Triglycerides: 109      Nutrition Plan  Interventions  Diet Consult: Completed    Other Nutrition Intervention: Diet Class;Therapist/Pt Discussion;Educational Videos;Provide with Written Material    Initial Rate Your Plate Score: 0 (Pt did not return/declined)    Follow-Up Rate Your Plate Score: 0 (declined)      Education Completed  Nutrition Education Completed: Low Saturated fat diet;Low sodium diet;Risk factor overview      Goals  Nutrition Goals (Next 30 days): Patient can identify their risk factors for CAD      Goals Met  Nutrition Goals Met: Patient follows a low sodium diet;Patient states following a low  "saturated fat diet;Patient knows appropriate portion size;Reviewed Dietitian schedule;Provided Rate your Plate Survey;Completed Nutritional Risk Screen;Patient has lost weight      Height, Weight, and  BMI  Weight: 182 lb 6.4 oz (82.7 kg)  Height: 5' 3\" (1.6 m)  BMI: 32.32      Nutrition Follow-up  Follow-up/Discharge: RD diet education completed 4/2/2019         Other Risk Factors  Other Risk Factor Assessment: Reassessment      HTN Risk Factor: Hypertension      Pre Exercise BP: 104/60  Post Exercise BP: (!) 88/62      Hypertension Plan  Goals  HTN Goals: Patient demonstrates understanding of HTN, no goals identified for the next 30 days      Goals Met  HTN Goals Met: Take medication as prescribed;Follow low sodium diet;Exercises regularly      HTN Interventions  HTN Interventions: Diet consult;Therapist/patient discussion;Provide written material;Offer educational classes      HTN Education Completed  HTN Education Completed: Medication review;Risk factor overview;Low sodium diet      Tobacco Risk Factor: NA      Risk Factor Follow-up   Follow-up/Discharge: Pt continuing to follow low sodium diet.  He has been exercising at home more consistantly.     PSYCHOSOCIAL  Psychosocial Assessment: Reassessment       Psychosocial Risk Factor: NA      Psychosocial Plan  Interventions  Interventions: Offer educational videos and classes;Provide written material;Individual education and counseling      Education Completed  Education Completed: Effects of stress on body;S/S of depression;Relaxation/Coping Techniques      Goals  Goals (Next 30 days): Improvement in [employer] COOP score;Practicing stress management skills      Goals Met  Goals Met: Identified Support system;Oriented to stress management classes;Identify stressors      Psychosocial Follow-up  Follow-up/Discharge: Pt denies stress. He has great support syste, and enjoys spending time with family             Patient involved in Goal setting?: Yes      Signature: " _____________________________________________________________    Date: __________________    Time: __________________

## 2021-05-29 NOTE — PROGRESS NOTES
ITP ASSESSMENT   Assessment Day: 60 Day    Session Number: 18  Precautions: Surgical    Diagnosis: CAB    Risk Stratification: High    Referring Provider: Flaco Alva MD   ITP: Dr. Orozco  EXERCISE  Exercise Assessment: Reassessment                         Exercise Plan  Goals Next 30 days  ADL'S: Resume household duties: sweep, mop and vacuuming.     Leisure: Exercise on non rehab days.     Work: Resume catering duties minimal 20lbs+, work on upper body stamina with arm use and strength training in CR      Education Goals: Patient can state cardiac s/s and appropriate emergency response.    Education Goals Met: Has system for taking medication.;Medication review.                          Goals Met  Will address 30 day goals met when pt is present.      Initial ADL's goals met: Goal not met: pt has not vacuumed yet    Initial Leisure goals met: Rarely pt walks on non rehab days 5 min 2 times since SOC    Intial Work goals met: Pt has returned to work but not doing full duties-he needs to lift heavier items 20+lbs He is on light duty.    Initial Progression: Pt has increased to a 3.2MET level for 30-40minutes with occasional c/o chest/incisional chest pressure-likely s/p CAB precations. Continue to progress pt to LTG of 5-6METs      Exercise Prescription  Exercise Mode: Treadmill;Bike;Nustep;Arm Erg.;Stairs    Frequency: 2x/week    Duration: 30-45minutes    Intensity / THR: 20-30 beats above resting heart rate    RPE 11-14  Progression / Met level: 3.2-4.5+    Resistive Training?: Yes      Current Exercise (mins/week): 75 (Pt is not regularly exercising at home. )      Interventions  Home Exercise:  Mode: Walking    Frequency: 2-3x/week    Duration: 15minutes x2/day on non rehab days      Education Material : Educational videos;Provide written material;Individual education and counseling;Offer educational classes      Education Completed  Exercise Education Completed: Cardiac Anatomy;Signs and  "Symptoms;Medication review;RPE;Emergency Plan;Home Exercise;Warm up/cool down;FITT Principles;BP/HR Reponse to exercise;Benefits of Exercise;End point of exercise;Stretching;Strength training              Exercise Follow-up/Discharge  Follow up/Discharge: (S) Will address exercise prescription/goals/goals met/intervention when pt present.   NUTRITION  Nutrition Assessment: Reassessment    Nutrition Risk Factors:  Nutrition Risk Factors: Diabetes;Dyslipidemia;Overweight  HbA1c: 8.8  Monitors blood sugar at home: Yes  Frequency: 2-4x/week  Cholesterol: 129  LDL: 72  HDL: 35  Triglycerides: 1009    Nutrition Plan  Interventions  Diet Consult: Completed    Other Nutrition Intervention: Diet Class;Therapist/Pt Discussion;Provide with Written Material    Initial Rate Your Plate Score: 0 (Pt did not return/declined)    Follow-Up Rate Your Plate Score: 0 (declined)      Education Completed  Nutrition Education Completed: Low Saturated fat diet;Low sodium diet;Risk factor overview      Goals  Nutrition Goals (Next 30 days): Patient can identify their risk factors for CAD      Goals Met  Nutrition Goals Met: Patient follows a low sodium diet;Patient states following a low saturated fat diet;Patient knows appropriate portion size;Reviewed Dietitian schedule;Provided Rate your Plate Survey;Completed Nutritional Risk Screen;Patient has lost weight      Height, Weight, and  BMI  Weight: 178 lb (80.7 kg)  Height: 5' 3\" (1.6 m)  BMI: 31.54      Nutrition Follow-up  Follow-up/Discharge: (S) Will address current weight/ Nutrition/CRF  goals/goals met/intervention when pt present.         Other Risk Factors  Other Risk Factor Assessment: Reassessment      HTN Risk Factor: Hypertension      Pre Exercise BP: 106/54  Post Exercise BP: 110/62      Hypertension Plan  Goals  HTN Goals: Exercises regularly      Goals Met  HTN Goals Met: Take medication as prescribed;Follow low sodium diet      HTN Interventions  HTN Interventions: Diet " consult;Therapist/patient discussion;Provide written material;Offer educational classes      HTN Education Completed  HTN Education Completed: Medication review;Risk factor overview;Low sodium diet      Tobacco Risk Factor: NA  Risk Factor Follow-up   Follow-up/Discharge: (S) Will address current BP and HTN goals/goals met/intervention when pt present.     PSYCHOSOCIAL  Psychosocial Assessment: Reassessment       [facility] Score: 26 (pre)      DESEAN-D Score: 13 (pre)      Psychosocial Risk Factor: NA      Psychosocial Plan  Interventions  Interventions: Offer educational videos and classes;Provide written material;Individual education and counseling      Education Completed  Education Completed: Effects of stress on body;S/S of depression;Relaxation/Coping Techniques      Goals  Goals (Next 30 days): Improvement in [employer] COOP score;Practicing stress management skills      Goals Met  Goals Met: Identified Support system;Oriented to stress management classes;Identify stressors      Psychosocial Follow-up  Follow-up/Discharge: (S) Will address Psychosocial goals/goals met/intervention when pt present.       Patient involved in Goal setting?: No (Will address goals/goals met/intervention when pt present.)

## 2021-05-30 NOTE — PROGRESS NOTES
ITP ASSESSMENT   Assessment Day: 120 Day    Session Number: 28  Precautions: Surgical    Diagnosis: CAB    Risk Stratification: High    Referring Provider: Flaco Alva MD   ITP sent to Dr. Orozco  EXERCISE  Exercise Assessment: Reassessment                            Exercise Plan  Goals Next 30 days  ADL'S: Independent    Leisure: Resume light swimiming at the [facility] for 10-20 mins.    Work: Increase U/E strength by increasing to 5 lbs when doing wt exercises.      Education Goals: Patient can state cardiac s/s and appropriate emergency response.    Education Goals Met: Has system for taking medication.;Medication review.                          Goals Met  60 day ADL'S goals met: Goal met- pt has resumed mopping and vacuuming at home.    60 day Leisure goals met: Goal met- pt has been walking 2x/week for 20 mins.    60 day Work goals met: Goal not reached- pt has not been doing wts 2x/week.    No data recorded    30 day ADL'S goals met: Goal partially met: Pt has resumed household duties. Pt feels SOB when doing chores    30 day Leisure goals met: Goal Met- Pt has been exercising 10 minutes everyday on non rehab days     30 day Work goals met: Goal partially met- Pt has been able to lift up 20 lb, continue to increase upperbody strength     30 Day Progression: Pt has reached a 3-4.4METs. Continue to progress pt as tolerated to LTG of 5-6METs. Pt has been limited with progression due to incisional discomfort and regular home ex program and regular CR attendence. Try other modalities to increase METs to LTG. Will address Activity goals/goals met/intervention when pt present.      Initial ADL's goals met: Goal not met: pt has not vacuumed yet    Initial Leisure goals met: Rarely pt walks on non rehab days 5 min 2 times since SOC    Intial Work goals met: Pt has returned to work but not doing full duties-he needs to lift heavier items 20+lbs He is on light duty.    Initial Progression: Pt has increased to a  3.2MET level for 30-40minutes with occasional c/o chest/incisional chest pressure-likely s/p CAB precations. Continue to progress pt to LTG of 5-6METs      Exercise Prescription  Exercise Mode: Treadmill;Bike;Nustep;Arm Erg.;Stairs    Frequency: 2x/week    Duration: 30-45 min.    Intensity / THR: 20-30 beats above resting heart rate    RPE 11-14  Progression / Met level: 5-6    Resistive Training?: Yes      Current Exercise (mins/week): 110      Interventions  Home Exercise:  Mode: walking, treadmimll    Frequency: 2-3x/week    Duration: 30-60 min.      Education Material : Educational videos;Provide written material;Individual education and counseling;Offer educational classes      Education Completed  Exercise Education Completed: Cardiac Anatomy;Signs and Symptoms;Medication review;RPE;Emergency Plan;Home Exercise;Warm up/cool down;FITT Principles;BP/HR Reponse to exercise;Benefits of Exercise;End point of exercise;Stretching;Strength training              Exercise Follow-up/Discharge  Follow up/Discharge: Patient is on hold right now due to insurance, pt. did state that he plans to go to the [facility] and exercise on treadmill up to 60 min. everyother day.   NUTRITION  Nutrition Assessment: Reassessment      Nutrition Risk Factors:  Nutrition Risk Factors: Diabetes;Dyslipidemia;Overweight  HbA1c: 8.8  Monitors blood sugar at home: Yes  Frequency: 2-4x/week  Cholesterol: 129  LDL: 72  HDL: 35  Triglycerides: 109      Nutrition Plan  Interventions  Diet Consult: Completed    Other Nutrition Intervention: Therapist/Pt Discussion    Initial Rate Your Plate Score: 0 (Pt did not return/declined)    Follow-Up Rate Your Plate Score: 0 (declined)      Education Completed  Nutrition Education Completed: Low Saturated fat diet;Low sodium diet;Risk factor overview      Goals  Nutrition Goals (Next 30 days): Patient can identify their risk factors for CAD      Goals Met  Nutrition Goals Met: Patient follows a low sodium  "diet;Patient states following a low saturated fat diet;Patient knows appropriate portion size;Reviewed Dietitian schedule;Provided Rate your Plate Survey;Completed Nutritional Risk Screen;Patient has lost weight      Height, Weight, and  BMI  Weight: 185 lb 9.6 oz (84.2 kg)  Height: 5' 3\" (1.6 m)  BMI: 32.89      Nutrition Follow-up  Follow-up/Discharge: RD diet education completed 4/2/2019         Other Risk Factors  Other Risk Factor Assessment: Reassessment      HTN Risk Factor: Hypertension      Pre Exercise BP: 104/66  Post Exercise BP: (!) 88/58 (Lightheadedness: H2O and Recheck: 102/58)      Hypertension Plan  Goals  HTN Goals: Patient demonstrates understanding of HTN, no goals identified for the next 30 days      Goals Met  HTN Goals Met: Take medication as prescribed;Follow low sodium diet;Exercises regularly      HTN Interventions  HTN Interventions: Diet consult;Therapist/patient discussion;Provide written material;Offer educational classes      HTN Education Completed  HTN Education Completed: Medication review;Risk factor overview;Low sodium diet      Tobacco Risk Factor: NA    Risk Factor Follow-up   Follow-up/Discharge: Will address next session.     PSYCHOSOCIAL  Psychosocial Assessment: Reassessment       [place] COOP Q of L Summary Score: 26      Psychosocial Risk Factor: NA      Psychosocial Plan  Interventions  Interventions: Offer educational videos and classes;Provide written material;Individual education and counseling      Education Completed  Education Completed: Effects of stress on body;S/S of depression;Relaxation/Coping Techniques      Goals  Goals (Next 30 days): Improvement in [employer] COOP score;Practicing stress management skills      Goals Met  Goals Met: Identified Support system;Oriented to stress management classes;Identify stressors      Psychosocial Follow-up  Follow-up/Discharge: Will address next session             Patient involved in Goal setting?: No      Signature: " _____________________________________________________________    Date: __________________    Time: __________________

## 2021-05-31 NOTE — PROGRESS NOTES
ITP ASSESSMENT   Assessment Day: 150 Day    Session Number: 28  Precautions: Surgical    Diagnosis: CAB    Risk Stratification: High    Referring Provider: Flaco Alva MD   ITP sent to Dr. Orozco  EXERCISE  Exercise Assessment: Reassessment      Education Goals: Patient can state cardiac s/s and appropriate emergency response.                          Goals Met    120 Day Progress: Patient has been on hold due to insurance.      60 day ADL'S goals met: Goal met- pt has resumed mopping and vacuuming at home.    60 day Leisure goals met: Goal met- pt has been walking 2x/week for 20 mins.    60 day Work goals met: Goal not reached- pt has not been doing wts 2x/week.    No data recorded    30 day ADL'S goals met: Goal partially met: Pt has resumed household duties. Pt feels SOB when doing chores    30 day Leisure goals met: Goal Met- Pt has been exercising 10 minutes everyday on non rehab days     30 day Work goals met: Goal partially met- Pt has been able to lift up 20 lb, continue to increase upperbody strength     30 Day Progression: Pt has reached a 3-4.4METs. Continue to progress pt as tolerated to LTG of 5-6METs. Pt has been limited with progression due to incisional discomfort and regular home ex program and regular CR attendence. Try other modalities to increase METs to LTG. Will address Activity goals/goals met/intervention when pt present.    Exercise Prescription  Exercise Mode: Treadmill;Bike;Nustep;Arm Erg.;Stairs    Frequency: 2x/week    Duration: 30-45 min.    Intensity / THR: 20-30 beats above resting heart rate    RPE 11-14  Progression / Met level: 5-6    Resistive Training?: Yes      Current Exercise (mins/week): 110      Interventions  Home Exercise:  Mode: walking, treadmimll    Frequency: 2-3x/week    Duration: 30-60 min.      Education Material : Educational videos;Provide written material;Individual education and counseling;Offer educational classes      Education Completed  Exercise  "Education Completed: Cardiac Anatomy;Signs and Symptoms;Medication review;RPE;Emergency Plan;Home Exercise;Warm up/cool down;FITT Principles;BP/HR Reponse to exercise;Benefits of Exercise;End point of exercise;Stretching;Strength training              Exercise Follow-up/Discharge  Follow up/Discharge: Patient continues to be on hold, has not checked with insurance.   NUTRITION  Nutrition Assessment: Reassessment      Nutrition Risk Factors:  Nutrition Risk Factors: Diabetes;Dyslipidemia;Overweight  HbA1c: 8.8  Monitors blood sugar at home: Yes  Frequency: 2-4x/week  Cholesterol: 129  LDL: 72  HDL: 35  Triglycerides: 109      Nutrition Plan  Interventions  Diet Consult: Completed    Other Nutrition Intervention: Therapist/Pt Discussion    No data recorded  Pre     No data recorded    Education Completed  Nutrition Education Completed: Low Saturated fat diet;Low sodium diet;Risk factor overview      Goals Met  Nutrition Goals Met: Patient follows a low sodium diet;Patient states following a low saturated fat diet;Patient knows appropriate portion size;Reviewed Dietitian schedule;Provided Rate your Plate Survey;Completed Nutritional Risk Screen;Patient has lost weight      Height, Weight, and  BMI  Weight: 185 lb 9.6 oz (84.2 kg)  Height: 5' 3\" (1.6 m)  BMI: 32.89    Pre BMI: 32.89     Nutrition Follow-up  Follow-up/Discharge: RD diet education completed 4/2/2019         Other Risk Factors  Other Risk Factor Assessment: Reassessment      HTN Risk Factor: Hypertension      Pre Exercise BP: 104/66  Post Exercise BP: (!) 88/58 (Lightheadedness: H2O and Recheck: 102/58)      Hypertension Plan  Goals  No data recorded    Goals Met  HTN Goals Met: Take medication as prescribed;Follow low sodium diet;Exercises regularly      HTN Interventions  HTN Interventions: Diet consult;Therapist/patient discussion;Provide written material;Offer educational classes      HTN Education Completed  HTN Education Completed: Medication " review;Risk factor overview;Low sodium diet      Tobacco Risk Factor: NA      Risk Factor Follow-up   Follow-up/Discharge: Will address next session.     PSYCHOSOCIAL  Psychosocial Assessment: Reassessment       [facility] COOP Q of L Summary Score: 26    Pre [facility] COOP Q of L Summary Score: 26     No data recorded  Pre       Psychosocial Risk Factor: NA      Psychosocial Plan  Interventions  If PHQ-9 is >9, send letter to MD  Interventions: Offer educational videos and classes;Provide written material;Individual education and counseling      Education Completed  Education Completed: Effects of stress on body;S/S of depression;Relaxation/Coping Techniques      Goals  No data recorded    Goals Met  Goals Met: Identified Support system;Oriented to stress management classes;Identify stressors      Psychosocial Follow-up  Follow-up/Discharge: Will address next session       Patient involved in Goal setting?: No      Signature: _____________________________________________________________    Date: __________________    Time: __________________

## 2021-05-31 NOTE — PROGRESS NOTES
ITP ASSESSMENT   Assessment Day: 180 Day    Session Number: 28  Precautions: Surgical    Diagnosis: CAB    Risk Stratification: High    Referring Provider: Flaco Alva MD   ITP sent to Dr. Orozco  EXERCISE  Exercise Assessment: Discharge                           Exercise Plan  Goals Next 30 days    Education Goals: Patient can state cardiac s/s and appropriate emergency response.                          Goals Met    150+ Day Progress: Patient was on hold due to insurance.  Patient was called today and he stated that he has not yet figured out his insurance and has decided not to return.  Will d/c chart     150+ Day Progress: Patient was on hold due to insurance.  Patient was called today and he stated that he has not yet figured out his insurance and has decided not to return.  Will d/c chart     120 Day Progress: Patient has been on hold due to insurance.    60 day ADL'S goals met: Goal met- pt has resumed mopping and vacuuming at home.    60 day Leisure goals met: Goal met- pt has been walking 2x/week for 20 mins.    60 day Work goals met: Goal not reached- pt has not been doing wts 2x/week.    30 day ADL'S goals met: Goal partially met: Pt has resumed household duties. Pt feels SOB when doing chores    30 day Leisure goals met: Goal Met- Pt has been exercising 10 minutes everyday on non rehab days     30 day Work goals met: Goal partially met- Pt has been able to lift up 20 lb, continue to increase upperbody strength     30 Day Progression: Pt has reached a 3-4.4METs. Continue to progress pt as tolerated to LTG of 5-6METs. Pt has been limited with progression due to incisional discomfort and regular home ex program and regular CR attendence. Try other modalities to increase METs to LTG. Will address Activity goals/goals met/intervention when pt present.    Exercise Prescription  Exercise Mode: Treadmill;Bike;Nustep;Arm Erg.;Stairs    Frequency: 2x/week    Duration: 30-45 min.    Intensity / THR:  "20-30 beats above resting heart rate    RPE 11-14  Progression / Met level: 5-6    Resistive Training?: Yes      Current Exercise (mins/week): 110      Interventions  Home Exercise:  Education Material : Educational videos;Provide written material;Individual education and counseling;Offer educational classes      Education Completed  Exercise Education Completed: Cardiac Anatomy;Signs and Symptoms;Medication review;RPE;Emergency Plan;Home Exercise;Warm up/cool down;FITT Principles;BP/HR Reponse to exercise;Benefits of Exercise;End point of exercise;Stretching;Strength training              Exercise Follow-up/Discharge  Follow up/Discharge: Patient states that he is walking for home exercise.   NUTRITION  Nutrition Assessment: Discharge      Nutrition Risk Factors:  Nutrition Risk Factors: Diabetes;Dyslipidemia;Overweight  HbA1c: 8.8  Cholesterol: 129  LDL: 72  HDL: 35  Triglycerides: 109      Nutrition Plan  Interventions  Diet Consult: Completed    Other Nutrition Intervention: Therapist/Pt Discussion      Education Completed  Nutrition Education Completed: Low Saturated fat diet;Low sodium diet;Risk factor overview      Goals Met  Nutrition Goals Met: Patient follows a low sodium diet;Patient states following a low saturated fat diet;Patient knows appropriate portion size;Reviewed Dietitian schedule;Provided Rate your Plate Survey;Completed Nutritional Risk Screen;Patient has lost weight      Height, Weight, and  BMI  Weight: 185 lb 9.6 oz (84.2 kg)  Height: 5' 3\" (1.6 m)  BMI: 32.89    Pre BMI: 32.89     Nutrition Follow-up  Follow-up/Discharge: RD diet education completed 4/2/2019         Other Risk Factors  Other Risk Factor Assessment: Discharge      HTN Risk Factor: Hypertension    Hypertension Plan    Goals Met  HTN Goals Met: Take medication as prescribed;Follow low sodium diet;Exercises regularly      HTN Interventions  HTN Interventions: Diet consult;Therapist/patient discussion;Provide written " material;Offer educational classes      HTN Education Completed  HTN Education Completed: Medication review;Risk factor overview;Low sodium diet      Tobacco Risk Factor: NA    Risk Factor Follow-up   Follow-up/Discharge: Patient has chosen not to return to cardiac rehab     PSYCHOSOCIAL  Psychosocial Assessment: Discharge       [facility] Q of L Summary Score: 26    Pre [facility] Q of L Summary Score: 26     Psychosocial Risk Factor: NA      Psychosocial Plan  Interventions  If PHQ-9 is >9, send letter to MD  Interventions: Offer educational videos and classes;Provide written material;Individual education and counseling      Education Completed  Education Completed: Effects of stress on body;S/S of depression;Relaxation/Coping Techniques    Goals Met  Goals Met: Identified Support system;Oriented to stress management classes;Identify stressors      Psychosocial Follow-up  Follow-up/Discharge: Patient has chosen not not return to cardiac rehab       Patient involved in Goal setting?: No      Signature: _____________________________________________________________    Date: __________________    Time: __________________

## 2021-06-02 VITALS — WEIGHT: 178 LBS

## 2021-06-02 VITALS — WEIGHT: 180 LBS

## 2021-06-02 VITALS — WEIGHT: 181.2 LBS

## 2021-06-02 VITALS — WEIGHT: 177.6 LBS

## 2021-06-03 VITALS — WEIGHT: 179 LBS

## 2021-06-03 VITALS — WEIGHT: 178 LBS

## 2021-06-03 VITALS — WEIGHT: 184 LBS

## 2021-06-03 VITALS — WEIGHT: 179.8 LBS

## 2021-06-03 VITALS — WEIGHT: 176.8 LBS

## 2021-06-03 VITALS — WEIGHT: 180 LBS

## 2021-06-03 VITALS — WEIGHT: 178.8 LBS

## 2021-06-03 VITALS — WEIGHT: 185.6 LBS

## 2021-06-03 VITALS — WEIGHT: 178.6 LBS

## 2021-06-03 VITALS — WEIGHT: 182.9 LBS

## 2021-06-03 VITALS — WEIGHT: 178.4 LBS

## 2021-06-03 VITALS — WEIGHT: 182.4 LBS

## 2021-06-03 VITALS — WEIGHT: 180.4 LBS

## 2021-06-03 VITALS — WEIGHT: 181.6 LBS

## 2021-06-20 NOTE — LETTER
Letter by Ladi Younger RN at      Author: Ladi Younger RN Service: -- Author Type: --    Filed:  Encounter Date: 6/18/2020 Status: (Other)       6/18/2020        Gayla Banda  [address] [facility] [id number]    This letter provides a written record that you were tested for COVID-19 on 6/16/2020.     Your result was negative. This means that we didnt find the virus that causes COVID-19 in your sample. A test may show negative when you do actually have the virus. This can happen when the virus is in the early stages of infection, before you feel illness symptoms.    If you have symptoms   Stay home and away from others (self-isolate) until you meet ALL of the guidelines below:    Youve had no fever--and no medicine that reduces fever--for 3 full days (72 hours). And ?    Your other symptoms have gotten better. For example, your cough or breathing has improved. And?    At least 10 days have passed since your symptoms started.    During this time:    Stay home. Dont go to work, school or anywhere else.     Stay in your own room, including for meals. Use your own bathroom if you can.    Stay away from others in your home. No hugging, kissing or shaking hands. No visitors.    Clean high touch surfaces often (doorknobs, counters, handles, etc.). Use a household cleaning spray or wipes. You can find a full list on the EPA website at www.epa.gov/pesticide-registration/list-n-disinfectants-use-against-sars-cov-2.    Cover your mouth and nose with a mask, tissue or washcloth to avoid spreading germs.    Wash your hands and face often with soap and water.    Going back to work  Check with your employer for any guidelines to follow for going back to work.    Employers: This document serves as formal notice that your employee tested negative for COVID-19, as of the testing date shown above.

## 2021-08-15 ENCOUNTER — HEALTH MAINTENANCE LETTER (OUTPATIENT)
Age: 51
End: 2021-08-15

## 2021-10-10 ENCOUNTER — HEALTH MAINTENANCE LETTER (OUTPATIENT)
Age: 51
End: 2021-10-10

## 2022-09-24 ENCOUNTER — HEALTH MAINTENANCE LETTER (OUTPATIENT)
Age: 52
End: 2022-09-24

## 2023-10-08 ENCOUNTER — HEALTH MAINTENANCE LETTER (OUTPATIENT)
Age: 53
End: 2023-10-08

## 2024-05-03 ENCOUNTER — TRANSCRIBE ORDERS (OUTPATIENT)
Dept: OTHER | Age: 54
End: 2024-05-03

## 2024-05-03 DIAGNOSIS — Z95.5 STENTED CORONARY ARTERY: Primary | ICD-10-CM

## 2024-05-03 DIAGNOSIS — I21.4 NSTEMI (NON-ST ELEVATED MYOCARDIAL INFARCTION) (H): ICD-10-CM

## 2024-05-14 ENCOUNTER — TRANSCRIBE ORDERS (OUTPATIENT)
Dept: OTHER | Age: 54
End: 2024-05-14

## 2024-06-05 ENCOUNTER — HOSPITAL ENCOUNTER (OUTPATIENT)
Dept: CARDIAC REHAB | Facility: CLINIC | Age: 54
Discharge: HOME | End: 2024-06-05
Attending: INTERNAL MEDICINE
Payer: COMMERCIAL

## 2024-06-05 DIAGNOSIS — Z95.5 STENTED CORONARY ARTERY: ICD-10-CM

## 2024-06-05 DIAGNOSIS — I21.4 NSTEMI (NON-ST ELEVATED MYOCARDIAL INFARCTION) (H): ICD-10-CM

## 2024-06-05 PROCEDURE — 93797 PHYS/QHP OP CAR RHAB WO ECG: CPT | Mod: 59

## 2024-06-05 PROCEDURE — 93798 PHYS/QHP OP CAR RHAB W/ECG: CPT

## 2024-06-06 ENCOUNTER — HOSPITAL ENCOUNTER (OUTPATIENT)
Dept: CARDIAC REHAB | Facility: CLINIC | Age: 54
Discharge: HOME | End: 2024-06-06
Attending: INTERNAL MEDICINE
Payer: COMMERCIAL

## 2024-06-06 PROCEDURE — 93798 PHYS/QHP OP CAR RHAB W/ECG: CPT

## 2024-06-14 ENCOUNTER — HOSPITAL ENCOUNTER (OUTPATIENT)
Dept: CARDIAC REHAB | Facility: CLINIC | Age: 54
Discharge: HOME | End: 2024-06-14
Attending: INTERNAL MEDICINE
Payer: COMMERCIAL

## 2024-06-14 PROCEDURE — 93798 PHYS/QHP OP CAR RHAB W/ECG: CPT

## 2024-06-21 ENCOUNTER — HOSPITAL ENCOUNTER (OUTPATIENT)
Dept: CARDIAC REHAB | Facility: CLINIC | Age: 54
Discharge: HOME | End: 2024-06-21
Attending: INTERNAL MEDICINE
Payer: COMMERCIAL

## 2024-06-21 PROCEDURE — 93798 PHYS/QHP OP CAR RHAB W/ECG: CPT

## 2024-07-02 ENCOUNTER — HOSPITAL ENCOUNTER (OUTPATIENT)
Dept: CARDIAC REHAB | Facility: CLINIC | Age: 54
Discharge: HOME | End: 2024-07-02
Attending: INTERNAL MEDICINE
Payer: COMMERCIAL

## 2024-07-02 PROCEDURE — 93798 PHYS/QHP OP CAR RHAB W/ECG: CPT

## 2024-07-03 ENCOUNTER — HOSPITAL ENCOUNTER (OUTPATIENT)
Dept: CARDIAC REHAB | Facility: CLINIC | Age: 54
Discharge: HOME | End: 2024-07-03
Attending: INTERNAL MEDICINE
Payer: COMMERCIAL

## 2024-07-03 PROCEDURE — 93797 PHYS/QHP OP CAR RHAB WO ECG: CPT | Mod: 59

## 2024-07-03 PROCEDURE — 93798 PHYS/QHP OP CAR RHAB W/ECG: CPT

## 2024-12-01 ENCOUNTER — HEALTH MAINTENANCE LETTER (OUTPATIENT)
Age: 54
End: 2024-12-01